# Patient Record
Sex: MALE
[De-identification: names, ages, dates, MRNs, and addresses within clinical notes are randomized per-mention and may not be internally consistent; named-entity substitution may affect disease eponyms.]

---

## 2019-03-15 PROBLEM — Z00.00 ENCOUNTER FOR PREVENTIVE HEALTH EXAMINATION: Status: ACTIVE | Noted: 2019-03-15

## 2019-03-19 ENCOUNTER — APPOINTMENT (OUTPATIENT)
Dept: OTOLARYNGOLOGY | Facility: CLINIC | Age: 69
End: 2019-03-19
Payer: MEDICARE

## 2019-03-19 ENCOUNTER — APPOINTMENT (OUTPATIENT)
Age: 69
End: 2019-03-19
Payer: MEDICARE

## 2019-03-19 VITALS
DIASTOLIC BLOOD PRESSURE: 89 MMHG | WEIGHT: 215 LBS | HEIGHT: 72 IN | BODY MASS INDEX: 29.12 KG/M2 | SYSTOLIC BLOOD PRESSURE: 128 MMHG | HEART RATE: 67 BPM

## 2019-03-19 DIAGNOSIS — Z78.9 OTHER SPECIFIED HEALTH STATUS: ICD-10-CM

## 2019-03-19 DIAGNOSIS — Z82.49 FAMILY HISTORY OF ISCHEMIC HEART DISEASE AND OTHER DISEASES OF THE CIRCULATORY SYSTEM: ICD-10-CM

## 2019-03-19 DIAGNOSIS — R42 DIZZINESS AND GIDDINESS: ICD-10-CM

## 2019-03-19 DIAGNOSIS — H90.3 SENSORINEURAL HEARING LOSS, BILATERAL: ICD-10-CM

## 2019-03-19 PROCEDURE — 99204 OFFICE O/P NEW MOD 45 MIN: CPT

## 2019-03-19 PROCEDURE — 92567 TYMPANOMETRY: CPT

## 2019-03-19 PROCEDURE — 92557 COMPREHENSIVE HEARING TEST: CPT

## 2019-03-19 RX ORDER — LOSARTAN POTASSIUM 100 MG/1
TABLET, FILM COATED ORAL
Refills: 0 | Status: ACTIVE | COMMUNITY

## 2019-03-19 RX ORDER — OMEPRAZOLE 20 MG/1
TABLET, DELAYED RELEASE ORAL
Refills: 0 | Status: ACTIVE | COMMUNITY

## 2019-03-19 RX ORDER — SIMVASTATIN 80 MG/1
TABLET, FILM COATED ORAL
Refills: 0 | Status: ACTIVE | COMMUNITY

## 2019-03-19 NOTE — HISTORY OF PRESENT ILLNESS
[de-identified] : 03/19/2019: The patient is a 68 year man with a history of vertigo first more than 20 years ago.  episodic vertigo has occurred for many years.\par 3 weeks ago, there was sustained feeling of dizziness, motion intolerance, mild nausea, fatigue. Better when holding still.  No symptoms in bed.  No headache reported. \par No sudden change in hearing.  No tinnitus\par Uses hearing aids bilaterally.

## 2019-03-19 NOTE — DATA REVIEWED
[de-identified] : Complete audiometry was ordered and completed today. This was separately reported by the audiologist. The results were reviewed in detail with the patient.\par Findings include:Middle and high-frequency hearing loss noted bilaterally and symmetric. Discrimination score is low normal bilaterally. Tympanometry is normal bilaterally. [de-identified] : MRI of the internal auditory canals April 2018: Normal

## 2019-03-19 NOTE — ASSESSMENT
[FreeTextEntry1] : Symptoms suggest vestibular dysfunction of a nonspecific type. I have recommended physical therapy for balance training. A neurology evaluation may be indicated if symptoms progress.\par \par Followup with audiologist for hearing aid adjustment as needed.

## 2019-03-19 NOTE — CONSULT LETTER
[Please see my note below.] : Please see my note below. [FreeTextEntry1] : Thank you for allowing me to participate in the care of KLAUS PINEDA .\par Please see the attached visit note.\par \par \par \par Shashank Osuna\par Otology\par Department of Otolaryngology\par [facility]  [FreeTextEntry2] : Dear DAVID GUAN

## 2022-09-20 ENCOUNTER — APPOINTMENT (OUTPATIENT)
Dept: SPEECH THERAPY | Facility: CLINIC | Age: 72
End: 2022-09-20